# Patient Record
(demographics unavailable — no encounter records)

---

## 2017-10-23 NOTE — [facility]
Stationary ECG Study

                           [facility] - ED

                                       

                                       Test Date:    2017-10-23

Pat Name:     GAYE TONY            Department:   

Patient ID:   [id number]                 Room:         -

Gender:       F                        Technician:   ar

:          1990               Requested By: KAREN TORRES

Order Number: [id number]     Reading MD:   Nava Oliveira

                                 Measurements

Intervals                              Axis          

Rate:         70                       P:            38

NH:           114                      QRS:          53

QRSD:         85                       T:            40

QT:           379                                    

QTc:          409                                    

                           Interpretive Statements

SINUS RHYTHM WITH SHORT NH INTERVAL

NO PRIOR FOR COMPARISON

Electronically Signed On 10- 21:16:21 EDT by Nava Oliveira